# Patient Record
Sex: FEMALE | Race: WHITE | NOT HISPANIC OR LATINO | Employment: FULL TIME | URBAN - METROPOLITAN AREA
[De-identification: names, ages, dates, MRNs, and addresses within clinical notes are randomized per-mention and may not be internally consistent; named-entity substitution may affect disease eponyms.]

---

## 2019-06-01 ENCOUNTER — OFFICE VISIT (OUTPATIENT)
Dept: URGENT CARE | Facility: CLINIC | Age: 34
End: 2019-06-01

## 2019-06-01 ENCOUNTER — HOSPITAL ENCOUNTER (OUTPATIENT)
Facility: MEDICAL CENTER | Age: 34
End: 2019-06-01
Attending: PHYSICIAN ASSISTANT

## 2019-06-01 VITALS
DIASTOLIC BLOOD PRESSURE: 82 MMHG | RESPIRATION RATE: 16 BRPM | OXYGEN SATURATION: 97 % | WEIGHT: 133 LBS | TEMPERATURE: 98.1 F | HEART RATE: 101 BPM | SYSTOLIC BLOOD PRESSURE: 122 MMHG

## 2019-06-01 DIAGNOSIS — R30.0 DYSURIA: ICD-10-CM

## 2019-06-01 DIAGNOSIS — R10.2 VAGINAL PAIN: ICD-10-CM

## 2019-06-01 DIAGNOSIS — N76.5 VAGINAL ULCERATION: ICD-10-CM

## 2019-06-01 LAB
APPEARANCE UR: NORMAL
BILIRUB UR STRIP-MCNC: NEGATIVE MG/DL
COLOR UR AUTO: CLEAR
GLUCOSE UR STRIP.AUTO-MCNC: NEGATIVE MG/DL
KETONES UR STRIP.AUTO-MCNC: NEGATIVE MG/DL
LEUKOCYTE ESTERASE UR QL STRIP.AUTO: NORMAL
NITRITE UR QL STRIP.AUTO: NEGATIVE
PH UR STRIP.AUTO: 7 [PH] (ref 5–8)
PROT UR QL STRIP: NEGATIVE MG/DL
RBC UR QL AUTO: NORMAL
SP GR UR STRIP.AUTO: <1.005
UROBILINOGEN UR STRIP-MCNC: 0.2 MG/DL

## 2019-06-01 PROCEDURE — 81002 URINALYSIS NONAUTO W/O SCOPE: CPT | Performed by: PHYSICIAN ASSISTANT

## 2019-06-01 PROCEDURE — 87660 TRICHOMONAS VAGIN DIR PROBE: CPT

## 2019-06-01 PROCEDURE — 87510 GARDNER VAG DNA DIR PROBE: CPT

## 2019-06-01 PROCEDURE — 87186 SC STD MICRODIL/AGAR DIL: CPT

## 2019-06-01 PROCEDURE — 99203 OFFICE O/P NEW LOW 30 MIN: CPT | Performed by: PHYSICIAN ASSISTANT

## 2019-06-01 PROCEDURE — 87591 N.GONORRHOEAE DNA AMP PROB: CPT

## 2019-06-01 PROCEDURE — 87529 HSV DNA AMP PROBE: CPT | Mod: 91

## 2019-06-01 PROCEDURE — 87480 CANDIDA DNA DIR PROBE: CPT

## 2019-06-01 PROCEDURE — 87491 CHLMYD TRACH DNA AMP PROBE: CPT

## 2019-06-01 PROCEDURE — 87086 URINE CULTURE/COLONY COUNT: CPT

## 2019-06-01 PROCEDURE — 87077 CULTURE AEROBIC IDENTIFY: CPT

## 2019-06-01 RX ORDER — VALACYCLOVIR HYDROCHLORIDE 1 G/1
1000 TABLET, FILM COATED ORAL 2 TIMES DAILY
Qty: 14 TAB | Refills: 0 | Status: SHIPPED | OUTPATIENT
Start: 2019-06-01 | End: 2019-06-08

## 2019-06-02 LAB
CANDIDA DNA VAG QL PROBE+SIG AMP: NEGATIVE
G VAGINALIS DNA VAG QL PROBE+SIG AMP: POSITIVE
T VAGINALIS DNA VAG QL PROBE+SIG AMP: NEGATIVE

## 2019-06-02 ASSESSMENT — ENCOUNTER SYMPTOMS
SORE THROAT: 0
FLANK PAIN: 0
DIARRHEA: 0
ABDOMINAL PAIN: 0
FATIGUE: 0
COUGH: 0
FEVER: 0
VOMITING: 0

## 2019-06-02 NOTE — PROGRESS NOTES
"Subjective:      Lyn Moctezuma is a 33 y.o. female who presents with UTI (painful during urination tenderness and really painful during sexual intercourse , not bieng able to urinate x 6 months on and off ) and Other (nausea x today )            Patient is a 33-year-old female presents to urgent care with concern regarding intermittent episodes of dysuria, painful intercourse for the last 6 months.  Of further note patient reports over the last 24 hours she attempted to have intercourse with her  of which the insertion was quite painful.  Since then she has had very painful urination and urinary urgency.  She denies any new sexual partners or fevers or chills.  Last normal menstrual cycle was approximately 3 weeks ago.  Patient does report slight discharge however she gets this \"sometimes \". She does report that her  has genital herpes in the past.       Vaginal Pain   This is a new problem. The current episode started more than 1 month ago. The problem has been waxing and waning. Associated symptoms include urinary symptoms. Pertinent negatives include no abdominal pain, congestion, coughing, fatigue, fever, rash, sore throat or vomiting. Exacerbated by: As above.  She has tried nothing for the symptoms.       Review of Systems   Constitutional: Negative for fatigue and fever.   HENT: Negative for congestion and sore throat.    Respiratory: Negative for cough.    Gastrointestinal: Negative for abdominal pain, diarrhea and vomiting.   Genitourinary: Positive for dysuria, frequency, urgency and vaginal pain. Negative for flank pain and hematuria.   Skin: Negative for rash.   All other systems reviewed and are negative.         Objective:     /82 (BP Location: Left arm, Patient Position: Sitting, BP Cuff Size: Adult)   Pulse (!) 101   Temp 36.7 °C (98.1 °F) (Temporal)   Resp 16   Wt 60.3 kg (133 lb)   LMP 05/18/2019 (Within Weeks)   SpO2 97%    PMH:  has no past medical history on " file.  MEDS:   Current Outpatient Prescriptions:   •  valacyclovir (VALTREX) 1 GM Tab, Take 1 Tab by mouth 2 times a day for 7 days., Disp: 14 Tab, Rfl: 0  ALLERGIES: No Known Allergies  SURGHX: No past surgical history on file.  SOCHX:  reports that she has never smoked. She has never used smokeless tobacco.  FH: Family history was reviewed, no pertinent findings to report    Physical Exam   Constitutional: She is oriented to person, place, and time. She appears well-developed and well-nourished. She appears distressed.   HENT:   Head: Normocephalic and atraumatic.   Mouth/Throat: No oropharyngeal exudate.   Eyes: Pupils are equal, round, and reactive to light. Conjunctivae and EOM are normal.   Neck: Normal range of motion. Neck supple. No tracheal deviation present.   Cardiovascular: Normal rate and regular rhythm.    No murmur heard.  Pulmonary/Chest: Effort normal and breath sounds normal. No respiratory distress.   Abdominal: There is no tenderness.   Genitourinary:       There is lesion on the right labia. There is lesion on the left labia. Cervix exhibits discharge. Cervix exhibits no motion tenderness. Right adnexum displays no tenderness. Left adnexum displays no tenderness. No erythema or bleeding in the vagina. Vaginal discharge found.   Genitourinary Comments: Ulcerative lesion to the 6 oclcok position of the introitus. Very tender on examination .   Musculoskeletal: Normal range of motion. She exhibits no edema.   Neurological: She is alert and oriented to person, place, and time. Coordination normal.   Skin: Skin is warm. No rash noted.   Psychiatric: Thought content normal. Her mood appears anxious. Her affect is labile.   Tearful     Vitals reviewed.              Assessment/Plan:     1. Dysuria  - POCT Urinalysis  - VAGINAL PATHOGENS DNA PANEL; Future  - CHLAMYDIA/GC PCR URINE OR SWAB; Future  - URINE CULTURE(NEW); Future  - HERPES VIRAL CULTURE    2. Vaginal pain  - POCT Urinalysis  - VAGINAL  PATHOGENS DNA PANEL; Future  - CHLAMYDIA/GC PCR URINE OR SWAB; Future  - URINE CULTURE(NEW); Future  - valacyclovir (VALTREX) 1 GM Tab; Take 1 Tab by mouth 2 times a day for 7 days.  Dispense: 14 Tab; Refill: 0  - HERPES VIRAL CULTURE    3. Vaginal ulceration  - valacyclovir (VALTREX) 1 GM Tab; Take 1 Tab by mouth 2 times a day for 7 days.  Dispense: 14 Tab; Refill: 0  - HERPES VIRAL CULTURE    Patient with notable tangential conversation throughout the duration of the visit today.  Also patient very tearful and upset.  Discussed the work-up at this time however wanting to be mindful of her status that she does not currently have insurance.  Patient would like for me to send off for the above for further testing.  Discussed also that herpes viral culture may be falsely negative if patient indeed has had ulcer for a lengthy period of time.  Patient understands and still would like for me to send the culture.  Clinically as patient is tender with notable ulceration to the introitus this is very consistent with her history of painful penetration.  Will write for the above and strongly encourage patient to make follow-up appointment with [facility]-as not only will she need recheck but also she will need a well woman exam in the future.  Patient given precautionary s/sx that mandate immediate follow up and evaluation in the ED. Advised of risks of not doing so.    DDX, Supportive care, and indications for immediate follow-up discussed with patient.    Instructed to return to clinic or nearest emergency department if we are not available for any change in condition, further concerns, or worsening of symptoms.    The patient demonstrated a good understanding and agreed with the treatment plan.  Please note that this dictation was created using voice recognition software. I have made every reasonable attempt to correct obvious errors, but I expect that there are errors of grammar and possibly content that I did not  discover before finalizing the note.

## 2019-06-03 LAB
BACTERIA UR CULT: ABNORMAL
BACTERIA UR CULT: ABNORMAL
C TRACH DNA SPEC QL NAA+PROBE: NEGATIVE
HSV1 DNA SPEC QL NAA+PROBE: NEGATIVE
HSV2 DNA SPEC QL NAA+PROBE: NEGATIVE
N GONORRHOEA DNA SPEC QL NAA+PROBE: NEGATIVE
SIGNIFICANT IND 70042: ABNORMAL
SITE SITE: ABNORMAL
SOURCE SOURCE: ABNORMAL
SPECIMEN SOURCE: NORMAL
SPECIMEN SOURCE: NORMAL

## 2019-06-04 ENCOUNTER — TELEPHONE (OUTPATIENT)
Dept: URGENT CARE | Facility: CLINIC | Age: 34
End: 2019-06-04

## 2019-06-04 DIAGNOSIS — N30.00 ACUTE CYSTITIS WITHOUT HEMATURIA: ICD-10-CM

## 2019-06-04 RX ORDER — NITROFURANTOIN 25; 75 MG/1; MG/1
100 CAPSULE ORAL 2 TIMES DAILY
Qty: 10 CAP | Refills: 0 | Status: SHIPPED | OUTPATIENT
Start: 2019-06-04 | End: 2019-06-09

## 2019-06-04 NOTE — TELEPHONE ENCOUNTER
"Called number- wrong number.   Trying to give results.   And emergency number is \"not in service\".     Will send Macrobid to her pharmacy as patient was with positive E.coli in her urine.         "